# Patient Record
Sex: FEMALE | Race: OTHER | NOT HISPANIC OR LATINO | URBAN - METROPOLITAN AREA
[De-identification: names, ages, dates, MRNs, and addresses within clinical notes are randomized per-mention and may not be internally consistent; named-entity substitution may affect disease eponyms.]

---

## 2019-06-07 ENCOUNTER — EMERGENCY (EMERGENCY)
Facility: HOSPITAL | Age: 39
LOS: 1 days | Discharge: HOME | End: 2019-06-07
Attending: EMERGENCY MEDICINE | Admitting: EMERGENCY MEDICINE
Payer: MEDICAID

## 2019-06-07 VITALS
DIASTOLIC BLOOD PRESSURE: 65 MMHG | HEART RATE: 109 BPM | SYSTOLIC BLOOD PRESSURE: 104 MMHG | RESPIRATION RATE: 16 BRPM | TEMPERATURE: 98 F | OXYGEN SATURATION: 100 %

## 2019-06-07 PROCEDURE — 99282 EMERGENCY DEPT VISIT SF MDM: CPT

## 2019-06-07 NOTE — ED PROVIDER NOTE - NSFOLLOWUPCLINICS_GEN_ALL_ED_FT
[facility]st[facility]  Gynceology/OB  [address] [place] [address]  Phone: [phone]  Fax:   Follow Up Time:

## 2019-06-07 NOTE — ED PROVIDER NOTE - OBJECTIVE STATEMENT
Patient had IVF approximately 5 months in [place]. Patient arrived to the [place] one week ago and requesting to have US to check the baby status; patient denies any other complaints.

## 2019-06-07 NOTE — ED PROVIDER NOTE - CLINICAL SUMMARY MEDICAL DECISION MAKING FREE TEXT BOX
AVSS, PE unremarkable, no emergent medical condition identified; patient given OB/GYN follow up information; patient is aware of signs/symptoms to return to ED

## 2019-06-07 NOTE — ED PROVIDER NOTE - CHPI ED SYMPTOMS NEG
no weakness/no decreased eating/drinking/no pain/no dizziness/no fever/no nausea/no tingling/no chills/no numbness/no vomiting/no vaginal bleeding

## 2019-06-07 NOTE — ED ADULT TRIAGE NOTE - CHIEF COMPLAINT QUOTE
Pt arrives requesting an OBGYN "check-up. " LMP January, had IVF done in [place] and returned apprx 1 week ago from [place]. Denies vag bleeding, abd pain, or any other discomforts at this time. Requesting for US.     Pt to be seen in ED as per MARLIN PAUL and Jennifer in L&D triage.